# Patient Record
Sex: FEMALE | Race: WHITE | NOT HISPANIC OR LATINO | Employment: FULL TIME | URBAN - METROPOLITAN AREA
[De-identification: names, ages, dates, MRNs, and addresses within clinical notes are randomized per-mention and may not be internally consistent; named-entity substitution may affect disease eponyms.]

---

## 2023-12-03 ENCOUNTER — NURSE TRIAGE (OUTPATIENT)
Dept: NURSING | Facility: CLINIC | Age: 23
End: 2023-12-03

## 2023-12-03 ENCOUNTER — OFFICE VISIT (OUTPATIENT)
Dept: URGENT CARE | Facility: URGENT CARE | Age: 23
End: 2023-12-03
Payer: COMMERCIAL

## 2023-12-03 VITALS
HEART RATE: 110 BPM | SYSTOLIC BLOOD PRESSURE: 133 MMHG | WEIGHT: 140 LBS | TEMPERATURE: 98.5 F | OXYGEN SATURATION: 100 % | DIASTOLIC BLOOD PRESSURE: 87 MMHG

## 2023-12-03 DIAGNOSIS — G44.201 ACUTE INTRACTABLE TENSION-TYPE HEADACHE: Primary | ICD-10-CM

## 2023-12-03 DIAGNOSIS — J02.9 VIRAL PHARYNGITIS: ICD-10-CM

## 2023-12-03 DIAGNOSIS — R11.2 NAUSEA AND VOMITING, UNSPECIFIED VOMITING TYPE: ICD-10-CM

## 2023-12-03 LAB
ALBUMIN UR-MCNC: 30 MG/DL
APPEARANCE UR: CLEAR
BILIRUB UR QL STRIP: ABNORMAL
COLOR UR AUTO: ABNORMAL
DEPRECATED S PYO AG THROAT QL EIA: NEGATIVE
FLUAV AG SPEC QL IA: NEGATIVE
FLUBV AG SPEC QL IA: NEGATIVE
GLUCOSE UR STRIP-MCNC: NEGATIVE MG/DL
HGB UR QL STRIP: ABNORMAL
KETONES UR STRIP-MCNC: >=160 MG/DL
LEUKOCYTE ESTERASE UR QL STRIP: NEGATIVE
NITRATE UR QL: NEGATIVE
PH UR STRIP: 7 [PH] (ref 5–7)
RBC #/AREA URNS AUTO: NORMAL /HPF
SP GR UR STRIP: 1.02 (ref 1–1.03)
UROBILINOGEN UR STRIP-ACNC: 1 E.U./DL
WBC #/AREA URNS AUTO: NORMAL /HPF

## 2023-12-03 PROCEDURE — 99284 EMERGENCY DEPT VISIT MOD MDM: CPT | Mod: 25 | Performed by: EMERGENCY MEDICINE

## 2023-12-03 PROCEDURE — 87804 INFLUENZA ASSAY W/OPTIC: CPT | Performed by: PHYSICIAN ASSISTANT

## 2023-12-03 PROCEDURE — 96372 THER/PROPH/DIAG INJ SC/IM: CPT | Performed by: PHYSICIAN ASSISTANT

## 2023-12-03 PROCEDURE — 99284 EMERGENCY DEPT VISIT MOD MDM: CPT | Mod: 25

## 2023-12-03 PROCEDURE — 93010 ELECTROCARDIOGRAM REPORT: CPT | Performed by: EMERGENCY MEDICINE

## 2023-12-03 PROCEDURE — 93005 ELECTROCARDIOGRAM TRACING: CPT

## 2023-12-03 PROCEDURE — 99204 OFFICE O/P NEW MOD 45 MIN: CPT | Mod: 25 | Performed by: PHYSICIAN ASSISTANT

## 2023-12-03 PROCEDURE — 81001 URINALYSIS AUTO W/SCOPE: CPT | Performed by: PHYSICIAN ASSISTANT

## 2023-12-03 PROCEDURE — 87651 STREP A DNA AMP PROBE: CPT | Performed by: PHYSICIAN ASSISTANT

## 2023-12-03 RX ORDER — ONDANSETRON 4 MG/1
4 TABLET, ORALLY DISINTEGRATING ORAL EVERY 8 HOURS PRN
Qty: 4 TABLET | Refills: 0 | Status: SHIPPED | OUTPATIENT
Start: 2023-12-03

## 2023-12-03 RX ORDER — KETOROLAC TROMETHAMINE 30 MG/ML
30 INJECTION, SOLUTION INTRAMUSCULAR; INTRAVENOUS ONCE
Status: COMPLETED | OUTPATIENT
Start: 2023-12-03 | End: 2023-12-03

## 2023-12-03 RX ORDER — ONDANSETRON 4 MG/1
4 TABLET, ORALLY DISINTEGRATING ORAL ONCE
Status: COMPLETED | OUTPATIENT
Start: 2023-12-03 | End: 2023-12-03

## 2023-12-03 RX ADMIN — KETOROLAC TROMETHAMINE 30 MG: 30 INJECTION, SOLUTION INTRAMUSCULAR; INTRAVENOUS at 17:57

## 2023-12-03 RX ADMIN — ONDANSETRON 4 MG: 4 TABLET, ORALLY DISINTEGRATING ORAL at 17:56

## 2023-12-03 NOTE — LETTER
December 3, 2023      Kamryn Quiros  [address] [facility] [address]        To Whom It May Concern:    Kamryn Quiros was seen in our clinic. She may return to {WORK OR SCHOOL OR :562906} without restrictions.      Sincerely,        Kelsey Juan PA-C

## 2023-12-03 NOTE — LETTER
December 3, 2023      [facility] [facility] [facility] [place] [address]        To Whom It May Concern:    Kamryn Quiros was seen in our clinic. Please excuse from work 12/3/23 - 12/5/23.       Sincerely,        Kelsey Juan PA-C

## 2023-12-04 ENCOUNTER — TELEPHONE (OUTPATIENT)
Dept: NURSING | Facility: CLINIC | Age: 23
End: 2023-12-04

## 2023-12-04 ENCOUNTER — HOSPITAL ENCOUNTER (EMERGENCY)
Facility: CLINIC | Age: 23
Discharge: HOME | End: 2023-12-04
Attending: EMERGENCY MEDICINE | Admitting: EMERGENCY MEDICINE
Payer: COMMERCIAL

## 2023-12-04 VITALS
HEART RATE: 86 BPM | RESPIRATION RATE: 16 BRPM | TEMPERATURE: 100.4 F | SYSTOLIC BLOOD PRESSURE: 96 MMHG | DIASTOLIC BLOOD PRESSURE: 49 MMHG | OXYGEN SATURATION: 100 %

## 2023-12-04 DIAGNOSIS — U07.1 COVID-19: ICD-10-CM

## 2023-12-04 DIAGNOSIS — R51.9 HEADACHE, UNSPECIFIED HEADACHE TYPE: ICD-10-CM

## 2023-12-04 LAB
ALBUMIN SERPL BCG-MCNC: 4.3 G/DL (ref 3.5–5.2)
ALP SERPL-CCNC: 49 U/L (ref 40–150)
ALT SERPL W P-5'-P-CCNC: 13 U/L (ref 0–50)
ANION GAP SERPL CALCULATED.3IONS-SCNC: 10 MMOL/L (ref 7–15)
AST SERPL W P-5'-P-CCNC: 23 U/L (ref 0–45)
ATRIAL RATE - MUSE: 116 BPM
BASOPHILS # BLD AUTO: 0 10E3/UL (ref 0–0.2)
BASOPHILS NFR BLD AUTO: 1 %
BILIRUB SERPL-MCNC: 0.3 MG/DL
BUN SERPL-MCNC: 8.7 MG/DL (ref 6–20)
CALCIUM SERPL-MCNC: 9 MG/DL (ref 8.6–10)
CHLORIDE SERPL-SCNC: 101 MMOL/L (ref 98–107)
CREAT SERPL-MCNC: 0.88 MG/DL (ref 0.51–0.95)
DEPRECATED HCO3 PLAS-SCNC: 25 MMOL/L (ref 22–29)
DIASTOLIC BLOOD PRESSURE - MUSE: NORMAL MMHG
EGFRCR SERPLBLD CKD-EPI 2021: >90 ML/MIN/1.73M2
EOSINOPHIL # BLD AUTO: 0 10E3/UL (ref 0–0.7)
EOSINOPHIL NFR BLD AUTO: 0 %
ERYTHROCYTE [DISTWIDTH] IN BLOOD BY AUTOMATED COUNT: 11.6 % (ref 10–15)
FLUAV RNA SPEC QL NAA+PROBE: NEGATIVE
FLUBV RNA RESP QL NAA+PROBE: NEGATIVE
GLUCOSE SERPL-MCNC: 88 MG/DL (ref 70–99)
GROUP A STREP BY PCR: NOT DETECTED
HCT VFR BLD AUTO: 38.8 % (ref 35–47)
HGB BLD-MCNC: 13 G/DL (ref 11.7–15.7)
IMM GRANULOCYTES # BLD: 0 10E3/UL
IMM GRANULOCYTES NFR BLD: 0 %
INTERPRETATION ECG - MUSE: NORMAL
LYMPHOCYTES # BLD AUTO: 0.8 10E3/UL (ref 0.8–5.3)
LYMPHOCYTES NFR BLD AUTO: 19 %
MCH RBC QN AUTO: 29.2 PG (ref 26.5–33)
MCHC RBC AUTO-ENTMCNC: 33.5 G/DL (ref 31.5–36.5)
MCV RBC AUTO: 87 FL (ref 78–100)
MONOCYTES # BLD AUTO: 0.5 10E3/UL (ref 0–1.3)
MONOCYTES NFR BLD AUTO: 13 %
NEUTROPHILS # BLD AUTO: 2.9 10E3/UL (ref 1.6–8.3)
NEUTROPHILS NFR BLD AUTO: 67 %
NRBC # BLD AUTO: 0 10E3/UL
NRBC BLD AUTO-RTO: 0 /100
P AXIS - MUSE: 32 DEGREES
PLATELET # BLD AUTO: 175 10E3/UL (ref 150–450)
POTASSIUM SERPL-SCNC: 3.4 MMOL/L (ref 3.4–5.3)
PR INTERVAL - MUSE: 136 MS
PROT SERPL-MCNC: 6.9 G/DL (ref 6.4–8.3)
QRS DURATION - MUSE: 80 MS
QT - MUSE: 328 MS
QTC - MUSE: 455 MS
R AXIS - MUSE: 57 DEGREES
RBC # BLD AUTO: 4.45 10E6/UL (ref 3.8–5.2)
RSV RNA SPEC NAA+PROBE: NEGATIVE
SARS-COV-2 RNA RESP QL NAA+PROBE: POSITIVE
SODIUM SERPL-SCNC: 136 MMOL/L (ref 135–145)
SYSTOLIC BLOOD PRESSURE - MUSE: NORMAL MMHG
T AXIS - MUSE: -5 DEGREES
VENTRICULAR RATE- MUSE: 116 BPM
WBC # BLD AUTO: 4.3 10E3/UL (ref 4–11)

## 2023-12-04 PROCEDURE — 96375 TX/PRO/DX INJ NEW DRUG ADDON: CPT

## 2023-12-04 PROCEDURE — 250N000011 HC RX IP 250 OP 636: Mod: JZ | Performed by: EMERGENCY MEDICINE

## 2023-12-04 PROCEDURE — 36415 COLL VENOUS BLD VENIPUNCTURE: CPT | Performed by: EMERGENCY MEDICINE

## 2023-12-04 PROCEDURE — 96374 THER/PROPH/DIAG INJ IV PUSH: CPT

## 2023-12-04 PROCEDURE — 80053 COMPREHEN METABOLIC PANEL: CPT | Performed by: EMERGENCY MEDICINE

## 2023-12-04 PROCEDURE — 87637 SARSCOV2&INF A&B&RSV AMP PRB: CPT | Performed by: EMERGENCY MEDICINE

## 2023-12-04 PROCEDURE — 258N000003 HC RX IP 258 OP 636: Performed by: EMERGENCY MEDICINE

## 2023-12-04 PROCEDURE — 85025 COMPLETE CBC W/AUTO DIFF WBC: CPT | Performed by: EMERGENCY MEDICINE

## 2023-12-04 PROCEDURE — 96361 HYDRATE IV INFUSION ADD-ON: CPT

## 2023-12-04 RX ORDER — KETOROLAC TROMETHAMINE 15 MG/ML
15 INJECTION, SOLUTION INTRAMUSCULAR; INTRAVENOUS ONCE
Status: COMPLETED | OUTPATIENT
Start: 2023-12-04 | End: 2023-12-04

## 2023-12-04 RX ORDER — METOCLOPRAMIDE HYDROCHLORIDE 5 MG/ML
10 INJECTION INTRAMUSCULAR; INTRAVENOUS ONCE
Status: COMPLETED | OUTPATIENT
Start: 2023-12-04 | End: 2023-12-04

## 2023-12-04 RX ORDER — DIPHENHYDRAMINE HYDROCHLORIDE 50 MG/ML
25 INJECTION INTRAMUSCULAR; INTRAVENOUS ONCE
Status: COMPLETED | OUTPATIENT
Start: 2023-12-04 | End: 2023-12-04

## 2023-12-04 RX ADMIN — DIPHENHYDRAMINE HYDROCHLORIDE 25 MG: 50 INJECTION, SOLUTION INTRAMUSCULAR; INTRAVENOUS at 01:26

## 2023-12-04 RX ADMIN — METOCLOPRAMIDE HYDROCHLORIDE 10 MG: 5 INJECTION INTRAMUSCULAR; INTRAVENOUS at 01:22

## 2023-12-04 RX ADMIN — KETOROLAC TROMETHAMINE 15 MG: 15 INJECTION, SOLUTION INTRAMUSCULAR; INTRAVENOUS at 01:24

## 2023-12-04 RX ADMIN — SODIUM CHLORIDE 1000 ML: 9 INJECTION, SOLUTION INTRAVENOUS at 01:21

## 2023-12-04 ASSESSMENT — ACTIVITIES OF DAILY LIVING (ADL): ADLS_ACUITY_SCORE: 35

## 2023-12-04 NOTE — PROGRESS NOTES
Assessment & Plan     1. Acute intractable tension-type headache  - ketorolac (TORADOL) injection 30 mg  - ondansetron (ZOFRAN ODT) ODT tab 4 mg  - UA Macroscopic with reflex to Microscopic and Culture - Clinic Collect  - Influenza A/B antigen  - Streptococcus A Rapid Screen w/Reflex to PCR - Clinic Collect  - Group A Streptococcus PCR Throat Swab  - UA Microscopic with Reflex to Culture    2. Nausea and vomiting, unspecified vomiting type  - ondansetron (ZOFRAN ODT) 4 MG ODT tab; Take 1 tablet (4 mg) by mouth every 8 hours as needed for nausea  Dispense: 4 tablet; Refill: 0    3. Viral pharyngitis    Patient presents to the clinic for evaluation of headache, nausea and vomiting along with subjective fever. On exam, she appears uncomfortable, but is non toxic appearing. She is neurologically intact. Lungs are CTAB, Throat is without PTA / RPA or deep neck space abscess. TMS clear B/L.  NO nuchal rigidity. She was given one dose of toradol in clinic along with zofran and noted significant reduction in headache and nausea.   UA and tachycardia suggestive of dehydration. Advised small sips of fluids frequently.   I suspect that a viral URI is the source of her headache. Encouraged supportive cares. No NSAIDs for the rest of the day. We discussed in detail if her headache were to return, if she has worsening symptoms, change in mental status, vomiting unable to hold down fluids, change in vision, numbness /tingling etc to follow-up in ER right away.       Return in about 1 day (around 12/4/2023), or if symptoms worsen or fail to improve.    Diagnosis and treatment plan was reviewed with patient and/or family.   We went over any labs or imaging. Discussed worsening symptoms or little to no relief despite treatment plan to follow-up with PCP or return to clinic.  Patient verbalizes understanding. All questions were addressed and answered.     Kelsey Jaun PA-C  [facility] [place]    CHIEF  COMPLAINT:   Chief Complaint   Patient presents with    Urgent Care    Migraine     C/O migraine,nausea and fever for 1 day     Subjective     Kamryn is a 23 year old female who presents to clinic today for evaluation. Yesterday, patient had a sore throat. She went to work and it worsened. During her shift at work, she developed flashing bright lights on one side of her peripheral vision. 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 Today, she has had worsening headache with nausea and vomiting. Headache is located in the frontal region and is band like. She has taken NSAIDs for her symptoms. Rates the headache as 9/10. She does not have a history of migraines.   Denies having neck pain. No abdominal pain, dysuria or hematuria.  Endorses feeling feverish and body aches.        No past medical history on file.  No past surgical history on file.  Social History     Tobacco Use    Smoking status: Never     Passive exposure: Never    Smokeless tobacco: Never   Substance Use Topics    Alcohol use: Not on file     Current Outpatient Medications   Medication    ondansetron (ZOFRAN ODT) 4 MG ODT tab     No current facility-administered medications for this visit.     No Known Allergies    10 point ROS of systems were all negative except for pertinent positives noted in my HPI.      Exam:   /87   Pulse 110   Temp 98.5  F (36.9  C) (Tympanic)   Wt 63.5 kg (140 lb)   SpO2 100%   Constitutional: alert and no distress. She is non toxic appearing.   Head: Normocephalic, atraumatic.  Eyes: conjunctiva clear, no drainage  ENT: TMs clear and shiny radha, nasal mucosa pink and moist, throat is erythematous without trismus or drooling.   Neck: neck is supple, no cervical lymphadenopathy or nuchal rigidity  Cardiovascular: RRR  Respiratory: CTA bilaterally, no rhonchi or rales  Gastrointestinal: soft and nontender. No rebound, guarding or rigidity.   Skin: no  rashes  Neurologic: Speech clear, gait normal. Moves all extremities. Neg pronator. Coordination intact B/L. Strength and sensation intact B/L.     Results for orders placed or performed in visit on 12/03/23   UA Macroscopic with reflex to Microscopic and Culture - Clinic Collect     Status: Abnormal    Specimen: Urine, Midstream   Result Value Ref Range    Color Urine Dark Yellow (A) Colorless, Straw, Light Yellow, Yellow    Appearance Urine Clear Clear    Glucose Urine Negative Negative mg/dL    Bilirubin Urine Small (A) Negative    Ketones Urine >=160 (A) Negative mg/dL    Specific Gravity Urine 1.025 1.003 - 1.035    Blood Urine Small (A) Negative    pH Urine 7.0 5.0 - 7.0    Protein Albumin Urine 30 (A) Negative mg/dL    Urobilinogen Urine 1.0 0.2, 1.0 E.U./dL    Nitrite Urine Negative Negative    Leukocyte Esterase Urine Negative Negative   UA Microscopic with Reflex to Culture     Status: Normal   Result Value Ref Range    RBC Urine 0-2 0-2 /HPF /HPF    WBC Urine None Seen 0-5 /HPF /HPF    Narrative    Urine Culture not indicated   Influenza A/B antigen     Status: Normal    Specimen: Nose; Swab   Result Value Ref Range    Influenza A antigen Negative Negative    Influenza B antigen Negative Negative    Narrative    Test results must be correlated with clinical data. If necessary, results should be confirmed by a molecular assay or viral culture.   Streptococcus A Rapid Screen w/Reflex to PCR - Clinic Collect     Status: Normal    Specimen: Throat; Swab   Result Value Ref Range    Group A Strep antigen Negative Negative

## 2023-12-04 NOTE — ED PROVIDER NOTES
ED Provider Note  [facility]      History     Chief Complaint   Patient presents with    Headache    Tachycardia     The history is provided by the patient and medical records.     Kamryn Quiros is a 23 year old female with no PMHx presents to the ED for headache, nausea, and vomiting.     Partner at bedside. Patient states that she had an ocular migraine that started yesterday accompanied with a sore throat. 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  This is followed by a headache that gradually worsened, nausea, and vomiting.  She also developed a cough, and it stings while she drinks.  There is also a slight pain on the back of her neck. Starting this morning, patient vomited again, went to the urgent care for stomach ache and nausea.  Patient was given Toradol and Zofran, which helped relieve symptoms, but once it wore off pain and nausea returned.  Patient also complains of alternating between being hot and cold, generalized body aches, and dizziness.  Patient denies a history of headaches or migraines. Patient says that the headache is behind both of her eyes and the front of her head, and used to be on the top of her head.  Patient describes the pain as an 8 out of 10.  Denies photophobia, urinary symptoms, runny nose, or taking any medication at home.  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  Patient has additional stressors lately with finals, applying for internships, and working full-time. She has not been able to sleep as well, has not been eating and drinking as much the last few days. She says that she is currently on birth control that skips periods, with last LMP approximately 1 year ago.      Patient was seen at [place] urgent care earlier today.  Streptococcus a rapid screen reflex PCR negative, influenza A and B  negative.  Microscopic and microscopic UA taken.     Past Medical History  No past medical history on file.  No past surgical history on file.  ondansetron (ZOFRAN ODT) 4 MG ODT tab      No Known Allergies  Family History  No family history on file.  Social History   Social History     Tobacco Use    Smoking status: Never     Passive exposure: Never    Smokeless tobacco: Never         A medically appropriate review of systems was performed with pertinent positives and negatives noted in the HPI, and all other systems negative.    Physical Exam   BP: 108/62  Pulse: 114  Temp: 99.2  F (37.3  C)  Resp: 20  SpO2: 98 %  Physical Exam  General: Afebrile, no acute distress   HEENT: Normocephalic, atraumatic, conjunctivae normal. MMM  Neck: non-tender, supple  Cardio: regular rate. regular rhythm   Resp: Normal work of breathing, no respiratory distress, lungs clear bilaterally, no wheezing, rhonchi, rales  Chest/Back: no visual signs of trauma, no CVA tenderness   Abdomen: soft, non distension, no tenderness, no peritoneal signs   Neuro: alert and fully oriented. CN II-XII intact. Normal strength and sensation in all extremities. Normal gait.  No meningeal signs  MSK: no deformities. Normal range of motion  Integumentary/Skin: no rash visualized, normal color  Psych: normal affect, normal behavior      ED Course, Procedures, & Data      Procedures            EKG Interpretation:      Interpreted by Arianna Dockery MD  Time reviewed: 0100  Symptoms at time of EKG: tachycardia   Rhythm: sinus tachycardia   Rate: 116  Axis: normal  Ectopy: none  Conduction: normal  ST Segments/ T Waves: No acute ischemic change  Q Waves: none  Comparison to prior: No old EKG available    Clinical Impression: sinus tachycardia    Results for orders placed or performed during the hospital encounter of 12/04/23   Comprehensive metabolic panel     Status: Normal   Result Value Ref Range    Sodium 136 135 - 145 mmol/L    Potassium 3.4 3.4 - 5.3  mmol/L    Carbon Dioxide (CO2) 25 22 - 29 mmol/L    Anion Gap 10 7 - 15 mmol/L    Urea Nitrogen 8.7 6.0 - 20.0 mg/dL    Creatinine 0.88 0.51 - 0.95 mg/dL    GFR Estimate >90 >60 mL/min/1.73m2    Calcium 9.0 8.6 - 10.0 mg/dL    Chloride 101 98 - 107 mmol/L    Glucose 88 70 - 99 mg/dL    Alkaline Phosphatase 49 40 - 150 U/L    AST 23 0 - 45 U/L    ALT 13 0 - 50 U/L    Protein Total 6.9 6.4 - 8.3 g/dL    Albumin 4.3 3.5 - 5.2 g/dL    Bilirubin Total 0.3 <=1.2 mg/dL   CBC with platelets and differential     Status: None   Result Value Ref Range    WBC Count 4.3 4.0 - 11.0 10e3/uL    RBC Count 4.45 3.80 - 5.20 10e6/uL    Hemoglobin 13.0 11.7 - 15.7 g/dL    Hematocrit 38.8 35.0 - 47.0 %    MCV 87 78 - 100 fL    MCH 29.2 26.5 - 33.0 pg    MCHC 33.5 31.5 - 36.5 g/dL    RDW 11.6 10.0 - 15.0 %    Platelet Count 175 150 - 450 10e3/uL    % Neutrophils 67 %    % Lymphocytes 19 %    % Monocytes 13 %    % Eosinophils 0 %    % Basophils 1 %    % Immature Granulocytes 0 %    NRBCs per 100 WBC 0 <1 /100    Absolute Neutrophils 2.9 1.6 - 8.3 10e3/uL    Absolute Lymphocytes 0.8 0.8 - 5.3 10e3/uL    Absolute Monocytes 0.5 0.0 - 1.3 10e3/uL    Absolute Eosinophils 0.0 0.0 - 0.7 10e3/uL    Absolute Basophils 0.0 0.0 - 0.2 10e3/uL    Absolute Immature Granulocytes 0.0 <=0.4 10e3/uL    Absolute NRBCs 0.0 10e3/uL   Symptomatic Influenza A/B, RSV, & SARS-CoV2 PCR (COVID-19) Nose     Status: Abnormal    Specimen: Nose; Swab   Result Value Ref Range    Influenza A PCR Negative Negative    Influenza B PCR Negative Negative    RSV PCR Negative Negative    SARS CoV2 PCR Positive (A) Negative    Narrative    Testing was performed using the [employer] Xpress CoV2/Flu/RSV Assay on the [employer] GeneXpert Instrument. This test should be ordered for the detection of SARS-CoV-2, influenza, and RSV viruses in individuals who meet clinical and/or epidemiological criteria. Test performance is unknown in asymptomatic patients. This test is for in vitro diagnostic  use under the FDA EUA for laboratories certified under CLIA to perform high or moderate complexity testing. This test has not been FDA cleared or approved. A negative result does not rule out the presence of PCR inhibitors in the specimen or target RNA in concentration below the limit of detection for the assay. If only one viral target is positive but coinfection with multiple targets is suspected, the sample should be re-tested with another FDA cleared, approved, or authorized test, if coinfection would change clinical management. This test was validated by the [facility]. These laboratories are certified under the Clinical Laboratory Improvement Amendments of 1988 (CLIA-88) as qualified to perform high complexity laboratory testing.   EKG 12 lead     Status: None (Preliminary result)   Result Value Ref Range    Systolic Blood Pressure  mmHg    Diastolic Blood Pressure  mmHg    Ventricular Rate 116 BPM    Atrial Rate 116 BPM    CO Interval 136 ms    QRS Duration 80 ms     ms    QTc 455 ms    P Axis 32 degrees    R AXIS 57 degrees    T Axis -5 degrees    Interpretation ECG       Sinus tachycardia  Nonspecific T wave abnormality  Abnormal ECG     CBC with platelets differential     Status: None    Narrative    The following orders were created for panel order CBC with platelets differential.  Procedure                               Abnormality         Status                     ---------                               -----------         ------                     CBC with platelets and d...[[id number]]                      Final result                 Please view results for these tests on the individual orders.     Medications   sodium chloride 0.9% BOLUS 1,000 mL (0 mLs Intravenous Stopped 12/4/23 0232)   ketorolac (TORADOL) injection 15 mg (15 mg Intravenous $Given 12/4/23 0124)   metoclopramide (REGLAN) injection 10 mg (10 mg Intravenous $Given 12/4/23 0122)   diphenhydrAMINE (BENADRYL)  injection 25 mg (25 mg Intravenous $Given 12/4/23 0126)     Labs Ordered and Resulted from Time of ED Arrival to Time of ED Departure   COMPREHENSIVE METABOLIC PANEL - Normal       Result Value    Sodium 136      Potassium 3.4      Carbon Dioxide (CO2) 25      Anion Gap 10      Urea Nitrogen 8.7      Creatinine 0.88      GFR Estimate >90      Calcium 9.0      Chloride 101      Glucose 88      Alkaline Phosphatase 49      AST 23      ALT 13      Protein Total 6.9      Albumin 4.3      Bilirubin Total 0.3     CBC WITH PLATELETS AND DIFFERENTIAL    WBC Count 4.3      RBC Count 4.45      Hemoglobin 13.0      Hematocrit 38.8      MCV 87      MCH 29.2      MCHC 33.5      RDW 11.6      Platelet Count 175      % Neutrophils 67      % Lymphocytes 19      % Monocytes 13      % Eosinophils 0      % Basophils 1      % Immature Granulocytes 0      NRBCs per 100 WBC 0      Absolute Neutrophils 2.9      Absolute Lymphocytes 0.8      Absolute Monocytes 0.5      Absolute Eosinophils 0.0      Absolute Basophils 0.0      Absolute Immature Granulocytes 0.0      Absolute NRBCs 0.0       No orders to display          Critical care was not performed.     Medical Decision Making  The patient's presentation was of moderate complexity (an acute illness with systemic symptoms).    The patient's evaluation involved:  an assessment requiring an independent historian (partner)  review of external note(s) from 1 sources (urgent care visit)  review of 3+ test result(s) ordered prior to this encounter (, strep, influenza from urgent care)  ordering and/or review of 3+ test(s) in this encounter (see separate area of note for details)    The patient's management necessitated moderate risk (prescription drug management including medications given in the ED).    Assessment & Plan    Kamryn Quiros is a 23 year old female with no PMHx presents to the ED for headache, nausea, and vomiting.  Upon arrival patient is nontoxic-appearing, afebrile,  moderate distress secondary to headache, nausea.  Upon arrival IV was established, comprehensive labs were performed, patient was treated with 1 L normal saline IV fluid bolus, and migraine cocktail with Toradol, Benadryl, Reglan.  Suspect headache/migraine possibly secondary to stress, decreased sleep, decreased hydration.  Patient reports headache was gradual in onset, no focal neurological deficit, patient nontoxic-appearing, low suspicious for acute intracranial hemorrhage/subarachnoid hemorrhage as mass.  Low suspicious for meningitis given overall nontoxic-appearing, no leukocytosis, no meningeal signs.  I reviewed comprehensive labs which are unremarkable with no leukocytosis, white blood cell count 4.3, hemoglobin 13, no acute metabolic electrolyte abnormality, no transaminitis.  I reviewed workup from urgent care earlier today.  On reevaluation patient resting comfortably with improvement of her symptoms.  Patient requesting discharge home.  Tachycardia improved.  Encourage close outpatient follow-up.  Strict return precautions discussed.  Patient understands and agrees with the plan.    Patients COVID test came back positive. Patient called with results.     I have reviewed the nursing notes. I have reviewed the findings, diagnosis, plan and need for follow up with the patient.    Discharge Medication List as of 12/4/2023  2:57 AM          Final diagnoses:   Headache, unspecified headache type   COVID-19   I, Andrew Kumar, am serving as a trained medical scribe to document services personally performed by Arianna Dockery MD, based to the provider's statements to me.     Arianna MORGAN MD, was physically present and have reviewed and verified the accuracy of this note documented by Andrew Kumar.     Arianna Dockery MD  formerly [facility] EMERGENCY DEPARTMENT  12/3/2023     Arianna Dockery MD  12/04/23 2162       Arianna Dockery MD  12/04/23 [id number]

## 2023-12-04 NOTE — DISCHARGE INSTRUCTIONS
Thank you for your patience today.  Please follow-up with your regular doctor in the next 2-3 days for further evaluation and follow-up care.  Please call to schedule an appointment.  Please rest, drink plenty of fluids.  Please alternate taking Tylenol and ibuprofen as needed for fever, pain.  Please continue your own medications.  Please return to the ER if you develop high fever, severe headache, persistent vomiting, worsening of your current symptoms.  It was a pleasure taking care of you today.  We hope you feel better soon.

## 2023-12-04 NOTE — TELEPHONE ENCOUNTER
Patient classified as COVID treatment eligible by Epic high risk algorithm:  No    Coronavirus (COVID-19) Notification    Reason for call  Notify of POSITIVE COVID-19 lab result, assess symptoms,  review [facility] recommendations    Lab Result   Lab test for 2019-nCoV rRt-PCR or SARS-COV-2 PCR  Oropharyngeal AND/OR nasopharyngeal swabs were POSITIVE for 2019-nCoV RNA [OR] SARS-COV-2 RNA (COVID-19) RNA     We have been unable to reach patient by phone at this time to notify of their Positive COVID-19 result.    Left voicemail message requesting a call back to [phone] [facility] for results.        A Positive COVID-19 letter will be sent via [employer] or the mail.    Mercy Stephens

## 2023-12-04 NOTE — PATIENT INSTRUCTIONS
Monitor symptoms  No NSAIDs for the rest of the day  If headache worsens, or you have vomiting unable to hold down fluids, change in vision, numbness /tingling or altered mental status please follow-up in ER  Zofran was sent to pharmacy if you have nausea tomorrow

## 2023-12-04 NOTE — LETTER
December 4, 2023      To Whom It May Concern:      Kamryn Quiros was seen in our Emergency Department today, 12/04/23.  I expect her condition to improve over the next 2-3 days.  She may return to work/school when improved.    Sincerely,        Arianna Dockery MD

## 2023-12-04 NOTE — TELEPHONE ENCOUNTER
"Consent to communicate with partner, Sincere, received verbally from patient.     Nurse Triage SBAR    Is this a 2nd Level Triage? NO    Situation: headache    Background: Patient was in [facility] today for bad headache and stomach ache. She was given ketorolac and zofran. She is now feeling very ill again. Her stomach is aching and she is having body aches. Wondering if they should go to ED? Can she take Tylenol?     Assessment: Temperature 102.6 axillary; headache rated at 8 on scale of 0-10, last urinated about 1800 this evening, has taken some Pedialyte since returning home, no emesis since returning home. Denies changes in vision. Abdominal pain rated at 4 on scale of 0-10. Occasional cough. Extremely Fatigued. Pain behind both eyes.     Protocol Recommended Disposition:   Go to ED Now (Or PCP Triage)    Recommendation: No PCP for 2LT. Reviewed recommendation and care advice as well as physician recommendation from today's note. Per physician recommendation in [facility] earlier today,   \"Encouraged supportive cares. No NSAIDs for the rest of the day. We discussed in detail if her headache were to return, if she has worsening symptoms, change in mental status, vomiting unable to hold down fluids, change in vision, numbness /tingling etc to follow-up in ER right away.\"   Patient's significant other [place] [facility] [place].        Sue Peña RN on 12/3/2023 at 11:15 PM      Reason for Disposition   [1] SEVERE headache AND [2] fever    Additional Information   Negative: Difficult to awaken or acting confused (e.g., disoriented, slurred speech)   Negative: [1] Weakness of the face, arm or leg on one side of the body AND [2] new-onset   Negative: [1] Numbness of the face, arm or leg on one side of the body AND [2] new-onset   Negative: [1] Loss of speech or garbled speech AND [2] new-onset   Negative: Passed out (i.e., lost consciousness, collapsed and was not responding)   Negative: Sounds like a " "life-threatening emergency to the triager   Negative: Followed a head injury   Negative: Pregnant   Negative: Postpartum (from 0 to 6 weeks after delivery)   Negative: Traumatic Brain Injury (TBI) is suspected   Negative: Unable to walk, or can only walk with assistance (e.g., requires support)   Negative: Stiff neck (can't touch chin to chest)   Negative: Severe pain in one eye   Negative: [1] Other family members (or people in same household) with headaches AND [2] possibility of carbon monoxide exposure   Negative: [1] SEVERE headache (e.g., excruciating) AND [2] \"worst headache\" of life   Negative: [1] SEVERE headache AND [2] sudden-onset (i.e., reaching maximum intensity within seconds to 1 hour)    Protocols used: Headache-A-AH    "

## 2025-04-11 PROBLEM — N80.9 ENDOMETRIOSIS: Status: ACTIVE | Noted: 2025-04-11

## 2025-04-11 PROBLEM — G89.29 CHRONIC PELVIC PAIN IN FEMALE: Status: ACTIVE | Noted: 2020-12-09

## 2025-04-11 PROBLEM — R10.2 CHRONIC PELVIC PAIN IN FEMALE: Status: ACTIVE | Noted: 2020-12-09

## 2025-04-11 PROBLEM — Z00.00 ROUTINE GENERAL MEDICAL EXAMINATION AT A HEALTH CARE FACILITY: Status: ACTIVE | Noted: 2025-04-11

## 2025-04-11 PROBLEM — Z97.5 NEXPLANON IN PLACE: Status: ACTIVE | Noted: 2020-12-09

## 2025-04-11 PROBLEM — Z97.5 NEXPLANON IN PLACE: Status: RESOLVED | Noted: 2020-12-09 | Resolved: 2025-04-11

## 2025-04-11 PROBLEM — N84.1 CERVICAL POLYP: Status: ACTIVE | Noted: 2025-04-11

## 2025-04-14 ENCOUNTER — TELEPHONE (OUTPATIENT)
Dept: FAMILY MEDICINE | Facility: CLINIC | Age: 25
End: 2025-04-14
Payer: COMMERCIAL

## 2025-04-14 NOTE — TELEPHONE ENCOUNTER
----- Message from Marcela Lopez sent at 4/14/2025 11:03 AM CDT -----  Please call with results.     Kamryn,    Your lab results have returned. Your cholesterol was high - not too concerning given that you are otherwise young and healthy, and I'd recommend we recheck this in a year or two. Your thyroid was normal. You do not have diabetes or prediabetes. Your STD testing was negative/normal.     If you have any questions or concerns, please let me know.    Dr. Lopez

## 2025-04-15 ENCOUNTER — HOSPITAL ENCOUNTER (OUTPATIENT)
Dept: ULTRASOUND IMAGING | Facility: CLINIC | Age: 25
Discharge: HOME | End: 2025-04-15
Payer: COMMERCIAL

## 2025-04-15 DIAGNOSIS — R10.2 PELVIC PAIN IN FEMALE: ICD-10-CM

## 2025-04-15 PROCEDURE — 76830 TRANSVAGINAL US NON-OB: CPT | Mod: 26 | Performed by: RADIOLOGY

## 2025-04-15 PROCEDURE — 76856 US EXAM PELVIC COMPLETE: CPT

## 2025-04-15 PROCEDURE — 76856 US EXAM PELVIC COMPLETE: CPT | Mod: 26 | Performed by: RADIOLOGY

## 2025-04-15 PROCEDURE — 76830 TRANSVAGINAL US NON-OB: CPT

## 2025-04-17 NOTE — RESULT ENCOUNTER NOTE
Please call with results.     Kamryn,    Your pelvic ultrasound was normal. No signs of endometriosis (which is often not seen on ultrasound anyway) but no other abnormalities either.    If you have any questions or concerns, please let me know.    Dr. Lopez